# Patient Record
Sex: FEMALE | Race: BLACK OR AFRICAN AMERICAN | Employment: FULL TIME | URBAN - METROPOLITAN AREA
[De-identification: names, ages, dates, MRNs, and addresses within clinical notes are randomized per-mention and may not be internally consistent; named-entity substitution may affect disease eponyms.]

---

## 2019-10-08 PROBLEM — D25.9 FIBROID UTERUS: Status: ACTIVE | Noted: 2019-10-08

## 2020-07-28 ENCOUNTER — APPOINTMENT (OUTPATIENT)
Dept: NUTRITION | Age: 45
End: 2020-07-28
Payer: MEDICAID

## 2020-08-18 ENCOUNTER — HOSPITAL ENCOUNTER (OUTPATIENT)
Dept: NUTRITION | Age: 45
Discharge: HOME | End: 2020-08-18
Payer: MEDICAID

## 2020-08-18 PROCEDURE — 97802 MEDICAL NUTRITION INDIV IN: CPT

## 2020-08-18 NOTE — PROGRESS NOTES
[address] [place] [address], [facility]  Phone: [phone] Fax: [phone]   Nutrition Assessment - Medical Nutrition Therapy   Outpatient Initial Evaluation         Patient Name: Melany Healy : 1975   Treatment Diagnosis: Obesity   Referral Source: Lauralyn Fuel* Start of Care [facility]): 2020     Gender: female Age: 54 y.o. Ht: 66 in Wt:  246 lb  kg   BMI: 39.7 BMR   Male  BMR Female [id number]     Past Medical History:  Obesity, arthritis/tendonitis     Pertinent Medications:   Prilosec, CPAP   Biochemical Data:   No results found for: HBA1C, HGBE8, HVF8MOYC, UBL2IMAK  Lab Results   Component Value Date/Time    Sodium 139 10/11/2019 07:30 AM    Potassium 3.4 (L) 10/11/2019 07:30 AM    Chloride 104 10/11/2019 07:30 AM    CO2 27 10/11/2019 07:30 AM    Anion gap 8 10/11/2019 07:30 AM    Glucose 83 10/11/2019 07:30 AM    BUN 4 (L) 10/11/2019 07:30 AM    Creatinine 0.5 (L) 10/11/2019 07:30 AM    BUN/Creatinine ratio 22 (H) 2009 09:25 PM    GFR est AA >60.0 10/11/2019 07:30 AM    GFR est non-AA >60 10/11/2019 07:30 AM    Calcium 8.6 10/11/2019 07:30 AM    Bilirubin, total 0.2 10/10/2019 04:35 AM    Alk. phosphatase 50 10/10/2019 04:35 AM    Protein, total 5.5 (L) 10/10/2019 04:35 AM    Albumin 2.6 (L) 10/10/2019 04:35 AM    Globulin 3.8 2009 09:25 PM    A-G Ratio 1.0 2009 09:25 PM    ALT (SGPT) 12 10/10/2019 04:35 AM    AST (SGOT) 15 10/10/2019 04:35 AM     No results found for: CHOL, CHOLPOCT, CHOLX, CHLST, CHOLV, HDL, HDLPOC, HDLP, LDL, LDLCPOC, LDLC, DLDLP, VLDLC, VLDL, TGLX, TRIGL, TRIGP, TGLPOCT, CHHD, CHHDX  Lab Results   Component Value Date/Time    ALT (SGPT) 12 10/10/2019 04:35 AM    AST (SGOT) 15 10/10/2019 04:35 AM    Alk.  phosphatase 50 10/10/2019 04:35 AM    Bilirubin, direct 0.1 2009 09:25 PM    Bilirubin, total 0.2 10/10/2019 04:35 AM     Lab Results   Component Value Date/Time Creatinine 0.5 (L) 10/11/2019 07:30 AM     Lab Results   Component Value Date/Time    BUN 4 (L) 10/11/2019 07:30 AM     No results found for: MCACR, MCA1, MCA2, MCA3, MCAU, MCAU2, MCALPOCT     Assessment:   Pt in today with desire to lose weight to follow through with surgery. Pt has not tried to lose weight in the past but she did lose weight when she had a hysterectomy in October 2019 following a liquid diet. Pt works PT and she moves around at work at times. Pt does most of the cooking at home. She does not exercise aside from stretching/moving her legs d/t stiffness. Food & Nutrition: Pt does not usually eat breakfast, will skip lunch somtimes, and eat dinner. She does not eat candy or sweets often, but she will have Jello cheesecake at times. Pt also drinks a lot of juice and sweetened tea, but no soda. She will drink 3 bottles of plain water/day. No alcohol intake. Pt admits to eating a lot of take-out meals as well. B: skips  Sn: chips, tuna and crackers  L: skips when working or tuna with crackers or wings with fries  D: sandwich or beef with mac n cheese and collards     Estimated Needs   Kcals/ 1700  Protein: 106 Carbs: 191 Fat: 57   day  g/day  g/day  g/day        percent: 25  45  30               Nutrition Diagnosis PES: Obesity r/t excessive energy intake and physical inactivity aeb BMI of 39.7. Nutrition Intervention &  Education: Discussed the Healthy Plate Method and appropriate portion sizes of different food groups. Explained the importance of combining carbohydrates with protein at meals and reviewed foods that contain each nutrient. Emphasized the importance of consistent meal time intervals throughout the day to improve metabolism. Explained calorie density and empty calories to assist pt with understanding portion sizes and limiting excess calories.  Educated pt on all carbohydrates found in foods and encouraged no more than 30-40 g/meal. Encouraged pt not to go longer than 5 hours without eating but to space meals/snacks at least 3 hours apart. Handouts Provided: [x]  Carbohydrates  [x]  Protein  [x]  Non-starchy Vegatbles  [x]  Food Label  [x]  Meal and Snack Ideas  []  Food Journals []  Diabetes  []  Cholesterol  []  Sodium  []  Gen Nutr Guidelines  []  SBGM Guidelines  [x]  Others: snacks   Information Reviewed with: Pt   Readiness to Change Stage: []  Pre-contemplative    [x]  Contemplative  []  Preparation               []  Action                  []  Maintenance   Potential Barriers to Learning: []  Decline in memory    []  Language barrier   []  Other:  []  Emotional                  []  Limited mobility  []  Lack of motivation     [] Vision, hearing or cognitive impairment   Expected Compliance: Fair     Nutritional Goal - To promote lifestyle changes to result in:    [x]  Weight loss  []  Improved diabetic control  []  Decreased cholesterol levels  []  Decreased blood pressure  []  Weight maintenance []  Preventing any interactions associated with food allergies  []  Adequate weight gain toward goal weight  []  Other:        Patient Goals:   1. Limit CHO's to 30-45 g each time you eat  2. Have at least 1 serving of protein each time you eat (balanced meals)  3.  Aim to have a meal or snack within 1-2 hrs of waking - repeat every 3-5 hrs     Dietitian Signature: Domenico Richardson MS, RD Date: 8/18/2020   Follow-up: Awaiting return call from pt Time: 11:37 AM

## 2022-09-13 ENCOUNTER — HOSPITAL ENCOUNTER (OUTPATIENT)
Dept: PHYSICAL THERAPY | Age: 47
Discharge: HOME | End: 2022-09-13
Payer: MEDICAID

## 2022-09-13 ENCOUNTER — TELEPHONE (OUTPATIENT)
Dept: PHYSICAL THERAPY | Age: 47
End: 2022-09-13

## 2022-09-13 PROCEDURE — 97162 PT EVAL MOD COMPLEX 30 MIN: CPT

## 2022-09-13 NOTE — PROGRESS NOTES
PHYSICAL THERAPY - DAILY TREATMENT NOTE    Patient Name: Calvin Thornton        Date: 2022  : 1975   yes Patient  Verified  Visit #:     Insurance: Payor: [employer] / Plan: [id number] / Product Type: Managed Care Medicaid /      In time: 9:00 Out time: 9:43   Total Treatment Time: 43     Medicare/BCBS Time Tracking (below)   Total Timed Codes (min):  N/A 1:1 Treatment Time:  N/A     TREATMENT AREA =  Right ankle pain [M25.571]    SUBJECTIVE  Pain Level (on 0 to 10 scale):  6  / 10   Medication Changes/New allergies or changes in medical history, any new surgeries or procedures?    no  If yes, update Summary List   Subjective Functional Status/Changes:  []  No changes reported     See initial eval          OBJECTIVE    5 min Gait Training:  _100__ feet with __FWW_ device (CAMboot R WBAT with heel lift) on level surfaces with __SBA_ level of assistance - cues for upright posture and to keep walker on ground instead of lifting it up with each step (adjusted height of walker due to pt coming into PT with walker too low and pt hunched forward over walker with amb). Pt negotiated 4 steps with B rails, non reciprocal pattern in CAMboot WBAT with CGA (pt able to demonstrate correct pattern after instruction by PT). (Not billed)   Rationale: To improve ambulation safety and efficiency in order to improve patient's ability to safely ambulate at home for self care. 5 min Self Care: Pt educated on correct way to wrap R foot/ankle/LE with ace bandages to decrease swelling (PT completed wrapping and pt reported understanding). Pt instructed to make sure ace bandages are not too tight and to remove if foot/toes go numb. Reviewed importance of not completing forceful R ankle ROM. Pt educated on signs/sxs of DVT and red flags and to seek immediate medical attention/911 if those occur. Pt reports understanding.  (Not billed)   Rationale:     decrease swelling and avoid post op complications  to improve the patients ability to complete ADLs. Billed With/As:   [] TE   [] TA   [] Neuro   [x] Self Care Patient Education: [] Review HEP    [] Progressed/Changed HEP based on:   [] positioning   [] body mechanics   [] transfers   [] heat/ice application    [x] other: Reviewed POC and goals. Pt reports understanding. Other Objective/Functional Measures:    See initial eval     Post Treatment Pain Level (on 0 to 10) scale:   8  / 10     ASSESSMENT  Assessment/Changes in Function:     See initial eval      []  See Progress Note/Recertification   Patient will continue to benefit from skilled PT services to see initial eval   Progress toward goals / Updated goals:    Pt denied sharp pains or red flags during PT session. See initial eval.     PLAN  [x]  Upgrade activities as tolerated yes Continue plan of care   []  Discharge due to :    [x]  Other: PT 2x/week for 6 weeks.       Therapist: Rena Workman PT    Date: 9/13/2022 Time: 9:43 AM     Future Appointments   Date Time Provider David Coronel   9/15/2022  9:30 AM Nelson Pruitt, PT [id number]   9/19/2022 11:00 AM Isidra Hall, PT [facility]   9/21/2022 11:00 AM Isidra Hall, PT [facility]   9/26/2022 11:00 AM Isidra Hall, PT [facility]   9/28/2022 11:00 AM Isidra Hall, PT [facility]

## 2022-09-15 ENCOUNTER — HOSPITAL ENCOUNTER (OUTPATIENT)
Dept: PHYSICAL THERAPY | Age: 47
Discharge: HOME | End: 2022-09-15
Payer: MEDICAID

## 2022-09-15 PROCEDURE — 97112 NEUROMUSCULAR REEDUCATION: CPT

## 2022-09-15 PROCEDURE — 97116 GAIT TRAINING THERAPY: CPT

## 2022-09-15 PROCEDURE — 97110 THERAPEUTIC EXERCISES: CPT

## 2022-09-15 NOTE — PROGRESS NOTES
PHYSICAL THERAPY - DAILY TREATMENT NOTE    Patient Name: Joao Phillips        Date: 9/15/2022  : 1975   yes Patient  Verified  Visit #:   2   of   12  Insurance: Payor: Romeo Be / Plan: [employer] / Product Type: Managed Care Medicaid /      In time: 496 Out time: 7369   Total Treatment Time: 37     Medicare/BCBS Time Tracking (below)   Total Timed Codes (min):  na 1:1 Treatment Time:  na     TREATMENT AREA =  Right ankle pain [M25.571]    SUBJECTIVE  Pain Level (on 0 to 10 scale):  8  / 10   Medication Changes/New allergies or changes in medical history, any new surgeries or procedures?    no  If yes, update Summary List   Subjective Functional Status/Changes:  []  No changes reported     Patient reports she has been noticing more pain and swelling now that she can put weight on her ankle. Patient states she is happy to be off of her scooter. OBJECTIVE  14 min Therapeutic Exercise:  [x]  See flow sheet   Rationale:      increase ROM and increase strength to improve the patients ability to perform walking activities. 7 (NB) min Manual Therapy: Retrograde massage to R ankle; gentle DF stretch to neutral all in prone   Rationale:      decrease pain, increase ROM, increase tissue extensibility, and decrease trigger points to improve patient's ability to perform household activities. The manual therapy interventions were performed at a separate and distinct time from the therapeutic activities interventions. 8 min Neuromuscular Re-ed: [x]  See flow sheet   Rationale:    improve balance and increase proprioception to improve the patients ability to perform standing activities. 8 min Gait Training:  Review stair negotiation with use of B handrails, appropriate LE sequencing as well as alterations to improve efficiency   Rationale: To improve ambulation safety and efficiency in order to improve patient's ability to safely ambulate at home for self care.     Billed With/As:   [x] TE   [] TA   [] Neuro   [] Self Care Patient Education: [x] Review HEP    [] Progressed/Changed HEP based on:   [] positioning   [] body mechanics   [] transfers   [] heat/ice application    [] other:      Other Objective/Functional Measures:    Progressed therapy program per flowsheet in order to improve ankle ROM, strength, flexibility and weight bearing tolerance  Patient able to ascend/descend stairs with use of B handrails, however still requires cuing on appropriate sequencing      Post Treatment Pain Level (on 0 to 10) scale:   6 / 10     ASSESSMENT  Assessment/Changes in Function:     Patient responded well to treatment session. Educated on potential increase in soreness related to today's exercise and how to respond appropriately. []  See Progress Note/Recertification   Patient will continue to benefit from skilled PT services to modify and progress therapeutic interventions, address functional mobility deficits, address ROM deficits, address strength deficits, analyze and address soft tissue restrictions, analyze and cue movement patterns, analyze and modify body mechanics/ergonomics, assess and modify postural abnormalities, and address imbalance/dizziness to attain remaining goals.    Progress toward goals / Updated goals:    Progressing with STG#1 per performance in session     PLAN  [x]  Upgrade activities as tolerated yes Continue plan of care   []  Discharge due to :    []  Other:      Therapist: Hema Haywood PT    Date: 9/15/2022 Time: 11:40 AM     Future Appointments   Date Time Provider David Coronel   9/19/2022 11:00 AM Wood Cline, PT [facility]   9/21/2022 11:00 AM Wood Cline PT [facility]   9/26/2022 11:00 AM Wood Cline PT [facility]   9/28/2022 11:00 AM Wood Cline, PT [facility]

## 2022-09-16 ENCOUNTER — APPOINTMENT (OUTPATIENT)
Dept: PHYSICAL THERAPY | Age: 47
End: 2022-09-16
Payer: MEDICAID

## 2022-09-19 ENCOUNTER — HOSPITAL ENCOUNTER (OUTPATIENT)
Dept: PHYSICAL THERAPY | Age: 47
Discharge: HOME | End: 2022-09-19
Payer: MEDICAID

## 2022-09-19 PROCEDURE — 97110 THERAPEUTIC EXERCISES: CPT

## 2022-09-19 PROCEDURE — 97116 GAIT TRAINING THERAPY: CPT

## 2022-09-19 PROCEDURE — 97535 SELF CARE MNGMENT TRAINING: CPT

## 2022-09-19 NOTE — PROGRESS NOTES
PHYSICAL THERAPY - DAILY TREATMENT NOTE    Patient Name: Amalia Gonzalez        Date: 2022  : 1975   yes Patient  Verified  Visit #:   3   of   12  Insurance: Payor: [employer] Fee / Plan: Birdena Salines / Product Type: Managed Care Medicaid /      In time: 10:58 Out time: 11:40   Total Treatment Time: 42     Medicare/[facility] Time Tracking (below)   Total Timed Codes (min):  N/A 1:1 Treatment Time:  N/A     TREATMENT AREA =  Right ankle pain [M25.571]    SUBJECTIVE  Pain Level (on 0 to 10 scale):  7  / 10   Medication Changes/New allergies or changes in medical history, any new surgeries or procedures?    no  If yes, update Summary List   Subjective Functional Status/Changes:  []  No changes reported     Pt reports she is no longer getting the soft cast put on 2x/week by Dr. Burke Ga office. Pt reports using ace bandages daily, ice prn at night and has been taking showers and getting R foot/ankle wet (sitting in shower). Pt reports she has only been using the [facility] when she first gets up in the morning and has been amb without A device the rest of the day. Pt reports compliance with CAMboot during the day. Pt denies falls or red flags. Pt has been completing R ankle AROM daily.           OBJECTIVE  Modalities Rationale:      N/A  to improve patient's ability to N/A - pt declined CP   min [] Estim, type/location:                                      []  att     []  unatt     []  w/US     []  w/ice    []  w/heat    min []  Mechanical Traction: type/lbs                   []  pro   []  sup   []  int   []  cont    []  before manual    []  after manual    min []  Ultrasound, settings/location:      min []  Iontophoresis w/ dexamethasone, location:                                               []  take home patch       []  in clinic    min []  Ice     []  Heat    location/position:     min []  Vasopneumatic Device, press/temp:    If using vaso (only need to measure limb vaso being performed on) pre-treatment girth :       post-treatment girth :       measured at (landmark location) :      min []  Other:    [] Skin assessment post-treatment (if applicable):    []  intact    []  redness- no adverse reaction                  []redness - adverse reaction:      15 min Therapeutic Exercise:  [x]  See flow sheet   Rationale:      increase ROM and increase strength to improve the patients ability to complete ADLs. 7 min Manual Therapy: Gentle R ankle DF PROM to neutral and gentle retrograde massage in supine with R LE elevated on wedge. (Not billed)   Rationale:      increase ROM and decrease edema  to improve patient's ability to complete ADLs. The manual therapy interventions were performed at a separate and distinct time from the therapeutic activities interventions. 10 min Gait Trainin feet and 100 feet with SPC device on level surface with cues to improve upright posture and advancement of SPC. Pt negotiated 4 steps with non reciprocal pattern and B rail safely and with improved efficiency. All performed in CAMboot. Rationale: To improve ambulation safety and efficiency in order to improve patient's ability to safely ambulate at home for self care. 10 min Self Care: Reviewed proper way to complete ankle/LE ace bandage wrap to decrease swelling. Pt instructed on amb with SPC to improve safety, gait pattern and efficiency with amb. Pt instructed to use ice prn 10-15 minutes on / 1 hour off. Reviewed importance of avoiding forceful R ankle AROM and to limit R ankle DF ROM to neutral for now. Pt reports understanding. Rationale:     improve gait, decrease swelling and improve safety  to improve the patients ability to complete ADLs and amb.     Billed With/As:   [] TE   [] TA   [] Neuro   [x] Self Care Patient Education: [x] Review HEP    [] Progressed/Changed HEP based on:   [] positioning   [] body mechanics   [] transfers   [] heat/ice application    [] other:      Other Objective/Functional Measures:  *R ankle circumferential measurements: 29 cm malleoli, 34 cm ankle crease to heel, 26cm base of 5th    *Pt came into PT amb without A device in CAMboot (R LE abducted, decreased stance R and increased lateral movement). Pt amb with significant improvements in gait pattern with SPC and was able to complete safe amb over level surface with SPC.    *No signs/sxs of DVT or infection    *R ankle AROM: -6 DF with knee flex, 26 degrees PF (both without pain increase)     Post Treatment Pain Level (on 0 to 10) scale:   5  / 10     ASSESSMENT  Assessment/Changes in Function:     Pt denied sharp pains or red flags during PT Rx. Pt demonstrates decrease in R ankle/LE swelling and improvements in R ankle AROM this session. Pt reported a decrease in pain levels at end of PT session. []  See Progress Note/Recertification   Patient will continue to benefit from skilled PT services to modify and progress therapeutic interventions, address functional mobility deficits, address ROM deficits, address strength deficits, analyze and address soft tissue restrictions, analyze and cue movement patterns, analyze and modify body mechanics/ergonomics, address imbalance/dizziness, and instruct in home and community integration to attain remaining goals. Progress toward goals / Updated goals:    Pt demonstrates decrease in R ankle swelling/circumferential measurements - has met STG #2.       PLAN  [x]  Upgrade activities as tolerated yes Continue plan of care   []  Discharge due to :    []  Other:      Therapist: Luis Enrique Hinds PT    Date: 9/19/2022 Time: 11:40 AM     Future Appointments   Date Time Provider David Coronel   9/21/2022 11:00 AM Natalie Ovalles, JITENDRA [facility]   9/26/2022 11:00 AM Natalie Ovalles PT [facility]   9/28/2022 11:00 AM Natalie Ovalles PT [facility]

## 2022-09-21 ENCOUNTER — HOSPITAL ENCOUNTER (OUTPATIENT)
Dept: PHYSICAL THERAPY | Age: 47
Discharge: HOME | End: 2022-09-21
Payer: MEDICAID

## 2022-09-21 PROCEDURE — 97110 THERAPEUTIC EXERCISES: CPT

## 2022-09-21 PROCEDURE — 97116 GAIT TRAINING THERAPY: CPT

## 2022-09-21 PROCEDURE — 97535 SELF CARE MNGMENT TRAINING: CPT

## 2022-09-21 NOTE — PROGRESS NOTES
PHYSICAL THERAPY - DAILY TREATMENT NOTE    Patient Name: Ana Laura Brownlee        Date: 2022  : 1975   yes Patient  Verified  Visit #:      12  Insurance: Payor: Marmatthewana [place] / Plan: [id number] / Product Type: Managed Care Medicaid /      In time: 10:56 Out time: 11:49   Total Treatment Time: 53     Medicare/BCBS Time Tracking (below)   Total Timed Codes (min):  N/A 1:1 Treatment Time:  N/A     TREATMENT AREA =  Right ankle pain [M25.571]    SUBJECTIVE  Pain Level (on 0 to 10 scale):  8 / 10   Medication Changes/New allergies or changes in medical history, any new surgeries or procedures?    no  If yes, update Summary List   Subjective Functional Status/Changes:  []  No changes reported   Pt reports her ankle is sore and achy today, because she was more active yesterday (prolonged standing, cooking and cleaning). Pt reports compliance with CAMboot. Pt uses ice prn for 10-15 minutes at a time (usually in morning and at night). Pt uses SPC at home while on the first floor, but does not use it upstairs. Pt is negotiating stairs with rail, non-reciprocal pattern. Pt denies falls or red flags. OBJECTIVE  Modalities Rationale:     decrease edema, decrease inflammation, and decrease pain to improve patient's ability to complete ADLs.    min [] Estim, type/location:                                      []  att     []  unatt     []  w/US     []  w/ice    []  w/heat    min []  Mechanical Traction: type/lbs                   []  pro   []  sup   []  int   []  cont    []  before manual    []  after manual    min []  Ultrasound, settings/location:      min []  Iontophoresis w/ dexamethasone, location:                                               []  take home patch       []  in clinic   10 min [x]  Ice     []  Heat    location/position: Supine to R ankle and heel with LE elevated on wedge    min []  Vasopneumatic Device, press/temp:    If using vaso (only need to measure limb vaso being performed on)      pre-treatment girth :       post-treatment girth :       measured at (landmark location) :      min []  Other:    [x] Skin assessment post-treatment (if applicable):    [x]  intact    [x]  no adverse reaction                  []redness - adverse reaction:      16 min Therapeutic Exercise:  [x]  See flow sheet   Rationale:      increase ROM and increase strength to improve the patients ability to complete ADLs. 7 min Manual Therapy: Gentle R ankle DF PROM to neutral and gentle retrograde massage in supine with R LE elevated on wedge. (Not billed)   Rationale:      increase ROM and decrease edema  to improve patient's ability to complete ADLs. The manual therapy interventions were performed at a separate and distinct time from the therapeutic activities interventions. 10 min Gait Trainin feet with SPC device on level surface. Pt negotiated 4 small hurdles in parallel bars 2x and 6 small hurdles with SPC 2x - all non reciprocal pattern, SBA with cues to avoid LE circumduction. All performed in CAMboot. Rationale: To improve ambulation safety and efficiency in order to improve patient's ability to safely ambulate at home for self care. 10 min Self Care: Reviewed importance of not pushing off with R ankle/toes in bed, avoiding forceful R ankle ROM, avoiding DF past neutral and avoiding resisted PF. Reviewed importance of LE elevation to decrease swelling. Reviewed use of ice prn 10-15 minutes on / at least 1 hour off. Reviewed use of SPC with amb and importance of negotiating stairs with non reciprocal pattern and rail. Reviewed correct way to wrap ankle/foot in ace bandage, to avoid wrapping too tight and to remove if toes/foot feels numb. Pt reports understanding. Rationale:     improve gait, decrease swelling and improve safety  to improve the patients ability to complete ADLs and amb.     Billed With/As:   [] TE   [] TA   [] Neuro   [x] Self Care Patient Education: [x] Review HEP    [] Progressed/Changed HEP based on:   [] positioning   [] body mechanics   [] transfers   [] heat/ice application    [] other:      Other Objective/Functional Measures:  *R ankle DF AROM with knee flex: -2 degrees     *No signs/sxs of DVT or infection    *Pt came to PT without A device - decreased stance time R, R LE abducted and decreased kirby. Pt reports she will bring her SPC to next PT session. Post Treatment Pain Level (on 0 to 10) scale:   6  / 10     ASSESSMENT  Assessment/Changes in Function:     Pt denied sharp pains or red flags during PT Rx. Pt reported an improvement in pain/sxs at end of session. []  See Progress Note/Recertification   Patient will continue to benefit from skilled PT services to modify and progress therapeutic interventions, address functional mobility deficits, address ROM deficits, address strength deficits, analyze and address soft tissue restrictions, analyze and cue movement patterns, analyze and modify body mechanics/ergonomics, address imbalance/dizziness, and instruct in home and community integration to attain remaining goals. Progress toward goals / Updated goals:  Pt demonstrates improvements in R ankle DF AROM this session - progressing towards STG #3.       PLAN  [x]  Upgrade activities as tolerated yes Continue plan of care   []  Discharge due to :    []  Other:      Therapist: Lakshmi Navarro PT    Date: 9/21/2022 Time: 11:44 AM     Future Appointments   Date Time Provider David Coronel   9/26/2022 11:00 AM Eufemia Jefferson PT [facility]   9/28/2022 11:00 AM Eufemia Jefferson PT [facility]

## 2022-09-26 ENCOUNTER — TELEPHONE (OUTPATIENT)
Dept: PHYSICAL THERAPY | Age: 47
End: 2022-09-26

## 2022-09-26 ENCOUNTER — HOSPITAL ENCOUNTER (OUTPATIENT)
Dept: PHYSICAL THERAPY | Age: 47
Discharge: HOME | End: 2022-09-26
Payer: MEDICAID

## 2022-09-26 PROCEDURE — 97535 SELF CARE MNGMENT TRAINING: CPT

## 2022-09-26 PROCEDURE — 97110 THERAPEUTIC EXERCISES: CPT

## 2022-09-26 PROCEDURE — 97116 GAIT TRAINING THERAPY: CPT

## 2022-09-26 NOTE — PROGRESS NOTES
PHYSICAL THERAPY - DAILY TREATMENT NOTE    Patient Name: Zuleyka Villalpando        Date: 2022  : 1975   yes Patient  Verified  Visit #:     of   12  Insurance: Payor: Cy Hammans / Plan: Bennett Quintero / Product Type: Managed Care Medicaid /      In time: 11:08 Out time: 11:56   Total Treatment Time: 48     Medicare/BCBS Time Tracking (below)   Total Timed Codes (min):  N/A 1:1 Treatment Time:  N/A     TREATMENT AREA =  Right ankle pain [M25.571]    SUBJECTIVE  Pain Level (on 0 to 10 scale):  5 / 10   Medication Changes/New allergies or changes in medical history, any new surgeries or procedures?    no  If yes, update Summary List   Subjective Functional Status/Changes:  []  No changes reported   Pt reports compliance with CAMboot. Pt has been using walker upstairs and quad cane downstairs with amb. Pt denies falls or red flags. Pt reports compliance with HEP. Pt reports she does not have a SPC she can use at this time.           OBJECTIVE  Modalities Rationale:      N/A  to improve patient's ability to complete N/A - pt declined CP   min [] Estim, type/location:                                      []  att     []  unatt     []  w/US     []  w/ice    []  w/heat    min []  Mechanical Traction: type/lbs                   []  pro   []  sup   []  int   []  cont    []  before manual    []  after manual    min []  Ultrasound, settings/location:      min []  Iontophoresis w/ dexamethasone, location:                                               []  take home patch       []  in clinic    min []  Ice     []  Heat    location/position:     min []  Vasopneumatic Device, press/temp:    If using vaso (only need to measure limb vaso being performed on)      pre-treatment girth :       post-treatment girth :       measured at (landmark location) :      min []  Other:    [] Skin assessment post-treatment (if applicable):    []  intact    []  no adverse reaction                  []redness - adverse reaction: 21 min Therapeutic Exercise:  [x]  See flow sheet   Rationale:      increase ROM and increase strength to improve the patients ability to complete ADLs. 7 min Manual Therapy: Gentle R ankle DF PROM to neutral and gentle retrograde massage in supine with R LE elevated on wedge. Submax isometrics (25%) R ankle DF/INV/EV 10x5 seconds each direction. (Not billed)   Rationale:      increase ROM, decrease edema , and improve strength/stability  to improve patient's ability to complete ADLs. The manual therapy interventions were performed at a separate and distinct time from the therapeutic activities interventions. 10 min Gait Trainin feet with quad cane device on level surface, with cues to make sure all points of quad cane are touching the ground during amb. Pt negotiated 6 small hurdles with non-reciprocal pattern, quad cane, SBA (cues to avoid LE circumduction and for advancement of quad cane). Pt negotiated 4 steps, B rail, non reciprocal pattern, safely. All performed in CAMboot. Rationale: To improve ambulation safety and efficiency in order to improve patient's ability to safely ambulate at home for self care. 10 min Self Care: Reviewed importance of not pushing off with R ankle/toes in bed, avoiding forceful R ankle ROM, avoiding DF past neutral and avoiding resisted PF. Reviewed use of ice prn 10-15 minutes on / at least 1 hour off. Reviewed use of quad cane with amb and importance of negotiating stairs with non reciprocal pattern and rail. Reviewed correct way to wrap ankle/foot in ace bandage, to avoid wrapping too tight and to remove if toes/foot feels numb. Pt reports understanding. Rationale:     improve gait, decrease swelling and improve safety  to improve the patients ability to complete ADLs and amb.     Billed With/As:   [] TE   [] TA   [] Neuro   [x] Self Care Patient Education: [x] Review HEP    [] Progressed/Changed HEP based on:   [] positioning   [] body mechanics [] transfers   [] heat/ice application    [] other:      Other Objective/Functional Measures:  *No signs/sxs of DVT or infection    *Added: NUstep in boot, seated HR AROM (no resistance) this session to improve mobility    *R ankle DF AROM after manual: 0 degrees       Post Treatment Pain Level (on 0 to 10) scale:   7 / 10     ASSESSMENT  Assessment/Changes in Function:     Pt denied sharp pains or red flags during PT Rx. Pt denied pain increase with new exercises. Pt demonstrates safe negotiation of 4 steps with B rail, CAMboot R and step to pattern. Pt reported feeling 0/10 pain levels with non weight bearing and 7/10 pain levels with wt bearing at end of session. []  See Progress Note/Recertification   Patient will continue to benefit from skilled PT services to modify and progress therapeutic interventions, address functional mobility deficits, address ROM deficits, address strength deficits, analyze and address soft tissue restrictions, analyze and cue movement patterns, analyze and modify body mechanics/ergonomics, address imbalance/dizziness, and instruct in home and community integration to attain remaining goals. Progress toward goals / Updated goals:  Pt demonstrates R ankle DF AROM to 0 degrees without an increase in pain levels - has met STG #3. PLAN  [x]  Upgrade activities as tolerated yes Continue plan of care   []  Discharge due to :    [x]  Other: Re-assess R ankle AROM all motions next session.      Therapist: Saumya Rosenberg PT    Date: 9/26/2022 Time: 11:50 AM     Future Appointments   Date Time Provider David Coronel   9/28/2022 11:00 AM Stuart Leyva PT [id number]

## 2022-09-28 ENCOUNTER — HOSPITAL ENCOUNTER (OUTPATIENT)
Dept: PHYSICAL THERAPY | Age: 47
Discharge: HOME | End: 2022-09-28
Payer: MEDICAID

## 2022-09-28 PROCEDURE — 97116 GAIT TRAINING THERAPY: CPT

## 2022-09-28 PROCEDURE — 97535 SELF CARE MNGMENT TRAINING: CPT

## 2022-09-28 PROCEDURE — 97110 THERAPEUTIC EXERCISES: CPT

## 2022-09-28 NOTE — PROGRESS NOTES
PHYSICAL THERAPY - DAILY TREATMENT NOTE    Patient Name: Lashon Ortiz        Date: 2022  : 1975   yes Patient  Verified  Visit #:      12  Insurance: Payor: Josie Artis / Plan: Number 100 / Product Type: Managed Care Medicaid /      In time: 11:03 Out time: 11:54   Total Treatment Time: 51     Medicare/BCBS Time Tracking (below)   Total Timed Codes (min):  N/A 1:1 Treatment Time:  N/A     TREATMENT AREA =  Right ankle pain [M25.571]    SUBJECTIVE  Pain Level (on 0 to 10 scale):  6 / 10   Medication Changes/New allergies or changes in medical history, any new surgeries or procedures?    no  If yes, update Summary List   Subjective Functional Status/Changes:  []  No changes reported   Pt reports feeling most of her pain/discomfort/soreness in the heel and back of ankle. Pt reports incision site is itchy at times. Pt denies falls or red flags. Pt reports compliance with HEP and CAMboot. Avg pain level: 7/10, Max pain level 10/10 (after prolonged amb/standing and stair negotiation). Pt denies falls or red flags.         OBJECTIVE  Modalities Rationale:      N/A  to improve patient's ability to complete N/A - pt declined CP   min [] Estim, type/location:                                      []  att     []  unatt     []  w/US     []  w/ice    []  w/heat    min []  Mechanical Traction: type/lbs                   []  pro   []  sup   []  int   []  cont    []  before manual    []  after manual    min []  Ultrasound, settings/location:      min []  Iontophoresis w/ dexamethasone, location:                                               []  take home patch       []  in clinic    min []  Ice     []  Heat    location/position:     min []  Vasopneumatic Device, press/temp:    If using vaso (only need to measure limb vaso being performed on)      pre-treatment girth :       post-treatment girth :       measured at (landmark location) :      min []  Other:    [] Skin assessment post-treatment (if applicable):    []  intact    []  no adverse reaction                  []redness - adverse reaction:      21 min Therapeutic Exercise:  [x]  See flow sheet   Rationale:      increase ROM and increase strength to improve the patients ability to complete ADLs. 7 min Manual Therapy: Gentle R ankle retrograde massage in supine with R LE elevated on wedge. Submax isometrics (25%) R ankle DF/INV/EV 10x5 seconds each direction. (Not billed)   Rationale:      decrease edema  and improve strength/stability  to improve patient's ability to complete ADLs. The manual therapy interventions were performed at a separate and distinct time from the therapeutic activities interventions. 12 min Gait Training: All completed in CAMboot without heel lift: 250 feet with quad cane device on level surface, with min cues for 4 point contact of quad cane. Pt negotiated 6 small hurdles with non-reciprocal pattern, quad cane, SBA (multiple cues to avoid LE circumduction, complete with non reciprocal pattern and correct advancement of quad cane). Pt negotiated 4 steps, B rail, non reciprocal pattern (required cues to ascend with L foot first in non reciprocal pattern). Rationale: To improve ambulation safety and efficiency in order to improve patient's ability to safely ambulate at home for self care. 11 min Self Care: Reviewed signs/sxs of infection with pt and to contact MD immediately if those occur. Reviewed importance of not pushing off with R ankle/toes in bed, avoiding forceful R ankle ROM, avoiding DF past neutral and avoiding resisted PF. Reviewed use of ice prn 10-15 minutes on / at least 1 hour off. Reviewed use of quad cane with amb and importance of negotiating stairs with non reciprocal pattern and rail. Reviewed correct way to wrap ankle/foot in ace bandage, to avoid wrapping too tight and to remove if toes/foot feels numb. Pt reports understanding.    Rationale:     improve gait, decrease swelling and improve safety  to improve the patients ability to complete ADLs and amb. Billed With/As:   [] TE   [] TA   [] Neuro   [x] Self Care Patient Education: [x] Review HEP    [] Progressed/Changed HEP based on:   [] positioning   [] body mechanics   [] transfers   [] heat/ice application    [] other:      Other Objective/Functional Measures:  *No signs/sxs of DVT or infection    *[facility]     *R ankle AROM: 0 degrees DF, 34 degrees PF, 12 degrees INV, 7 degrees EV       Post Treatment Pain Level (on 0 to 10) scale:   0 / 10     ASSESSMENT  Assessment/Changes in Function:     Pt denied sharp pains or red flags during PT Rx. Pt denied pain at end of session. []  See Progress Note/Recertification   Patient will continue to benefit from skilled PT services to modify and progress therapeutic interventions, address functional mobility deficits, address ROM deficits, address strength deficits, analyze and address soft tissue restrictions, analyze and cue movement patterns, analyze and modify body mechanics/ergonomics, address imbalance/dizziness, and instruct in home and community integration to attain remaining goals. Progress toward goals / Updated goals:  Pt demonstrates improvements in R ankle AROM. Pt has not met LTG #2 (still reporting 10/10 max pain levels). Pt progressing towards LTG #3 (progressed from [address] [place] [address] with heel lift to [facility] without heel lift this session). PLAN  [x]  Upgrade activities as tolerated yes Continue plan of care   []  Discharge due to :    []  Other:      Therapist: Juan Pablo Stuart PT    Date: 9/28/2022 Time: 11:16 AM     No future appointments.

## 2022-10-05 ENCOUNTER — HOSPITAL ENCOUNTER (OUTPATIENT)
Dept: PHYSICAL THERAPY | Age: 47
Discharge: HOME | End: 2022-10-05
Payer: MEDICAID

## 2022-10-05 PROCEDURE — 97112 NEUROMUSCULAR REEDUCATION: CPT

## 2022-10-05 PROCEDURE — 97110 THERAPEUTIC EXERCISES: CPT

## 2022-10-05 PROCEDURE — 97116 GAIT TRAINING THERAPY: CPT

## 2022-10-05 NOTE — PROGRESS NOTES
PHYSICAL THERAPY - DAILY TREATMENT NOTE    Patient Name: Sahra Davis        Date: 10/5/2022  : 1975   yes Patient  Verified  Visit #:      12  Insurance: Payor: Karan Zee / Plan: Amelia Delgado / Product Type: Managed Care Medicaid /      In time: 138 Out time: 1010   Total Treatment Time: 37     Medicare/BCBS Time Tracking (below)   Total Timed Codes (min):  na 1:1 Treatment Time:  na     TREATMENT AREA =  Right ankle pain [M25.571]    SUBJECTIVE  Pain Level (on 0 to 10 scale):  4  / 10   Medication Changes/New allergies or changes in medical history, any new surgeries or procedures?    no  If yes, update Summary List   Subjective Functional Status/Changes:  []  No changes reported     Patient reports she isn't having much pain today. Patient states overall she has been doing well since her last appointment. OBJECTIVE  10 min Therapeutic Exercise:  [x]  See flow sheet   Rationale:      increase ROM and increase strength to improve the patients ability to perform walking activities. 7 min Manual Therapy: Retrograde massage to R ankle; gentle ankle DF stretch, manual isometrics into IV/EV 5x15 sec each   Rationale:      decrease pain, increase ROM, increase tissue extensibility, and decrease trigger points to improve patient's ability to perform standing activities. The manual therapy interventions were performed at a separate and distinct time from the therapeutic activities interventions. 10 min Neuromuscular Re-ed: [x]  See flow sheet   Rationale:    improve coordination, improve balance, and increase proprioception to improve the patients ability to perform household activities. 10 min Gait Training:  _50_ feet independently level surfaces with __supervision_ level of assistance   Rationale: To improve ambulation safety and efficiency in order to improve patient's ability to safely ambulate at home for self care.     Billed With/As:   [x] TE   [] TA   [] Neuro   [] Self Care Patient Education: [x] Review HEP    [] Progressed/Changed HEP based on:   [] positioning   [] body mechanics   [] transfers   [] heat/ice application    [] other:      Other Objective/Functional Measures:    Patient able to perform clay negotiation without use of quad cane this session and maintaining use of CAM boot  Added tandem balance this session in order to improve patient stabilization     Post Treatment Pain Level (on 0 to 10) scale:   0  / 10     ASSESSMENT  Assessment/Changes in Function:     Patient tolerated progression of program well. Plan to re-assess objective measurements and NV in preparation for follow up with MD on 10/10/22. []  See Progress Note/Recertification   Patient will continue to benefit from skilled PT services to modify and progress therapeutic interventions, address functional mobility deficits, address ROM deficits, address strength deficits, analyze and address soft tissue restrictions, analyze and cue movement patterns, analyze and modify body mechanics/ergonomics, and assess and modify postural abnormalities to attain remaining goals. Progress toward goals / Updated goals:    Progressing with LTG#3-improving gait mechanics compared to previous sessions.       PLAN  [x]  Upgrade activities as tolerated yes Continue plan of care   []  Discharge due to :    []  Other:      Therapist: Faiza Wills, PT    Date: 10/5/2022 Time: 11:48 AM     Future Appointments   Date Time Provider David Coronel   10/7/2022 10:45 AM Yazan Campbell PT [facility]   10/10/2022 11:00 AM Huan Arguello PT [facility]   10/12/2022  9:00 AM Madina Tucker [facility]   10/17/2022 11:00 AM Huan Arguello PT [facility]   10/19/2022  9:25 AM Jen Young, PT [facility] [id number]

## 2022-10-07 ENCOUNTER — HOSPITAL ENCOUNTER (OUTPATIENT)
Dept: PHYSICAL THERAPY | Age: 47
Discharge: HOME | End: 2022-10-07
Payer: MEDICAID

## 2022-10-07 PROCEDURE — 97535 SELF CARE MNGMENT TRAINING: CPT

## 2022-10-07 PROCEDURE — 97110 THERAPEUTIC EXERCISES: CPT

## 2022-10-07 PROCEDURE — 97112 NEUROMUSCULAR REEDUCATION: CPT

## 2022-10-07 NOTE — THERAPY RECERTIFICATION
[facility] PHYSICAL THERAPY  [address],[facility], [facility] - Phone: [phone]  Fax: [phone]  PROGRESS NOTE  Patient Name: Raven Sanders : 1975   Treatment/Medical Diagnosis: Right ankle pain [M25.571]   Referral Source: China Riley DPM     Date of Initial Visit: 22 Attended Visits: 8 Missed Visits: 0     SUMMARY OF TREATMENT  Patient has attended 7 follow up visits since her initial evaluation on 22. Patient has received therapeutic exercise, manual therapy, neuromuscular re-education and gait training in order to improve  Rankle ROM, mobility, flexibility, strength and pain reduction. CURRENT STATUS  Patient has progressed well with her PT program to date. Patient demonstrates an ability to ambulate with her CAM boot only, no longer requiring an assistive device. Patient's swelling has reduced as listed below which has helped control her pain levels. While patient's DF AROM has not improved, she demonstrates gains in IV (30 degrees), EV (3 degrees), and PF (50 degrees). Patient is appropriate for the next phase of her rehab program per MD approval.     Goal/Measure of Progress Goal Met? 1.  Pt to demonstrate independence with HEP to improve R ankle AROM and decrease LE swelling for ADLs and amb. Status at last Eval: N/a Current Status: Independent with HEP yes   2. Pt to demonstrate . 5cm or > decrease in R ankle/foot circumferential measurements to improve LE proprioception with amb and ADLs. Status at last Eval: circumferential measurements R ankle/foot (Malleoli R 30 cm, L 28 cm; Ankle crease to heel R 34.5 cm, L 34 cm; Base of 5th R 26.5 cm L 25.5 cm Current Status: R Circumferential measurements: Malleoli-29 cm  Ankle crease to heel- 33cm  Base of 5th metatarsal 22 cm yes   3.   Pt to demonstrate 0 degrees R ankle DF AROM/PROM to improve mobility for amb   Status at last Eval: R ankle AROM (R: -8 degrees DF with knee flex, -11 degrees DF with knee ext Current Status: R ankle DF with knee extended -13 no     New Goals to be achieved in __4__  weeks:  1. Pt to report 46/100 or > on FOTO scores to improve functional mobility for amb and stair negotiation  2. Pt to report 50% or > decrease in max pain levels to improve functional mobility for ADLs and amb  3. Pt to demonstrate even stride length with amb over level surface of 250 feet in regular shoe without A device safely   RECOMMENDATIONS  Patient would benefit from continued PT 2x/week for 4 weeks in order to further address impairments and functional limitations. If you have any questions/comments please contact us directly at [phone]. Thank you for allowing us to assist in the care of your patient. Therapist Signature: Raissa Concepcion PT Date: 10/7/2022     Time: 11:51 AM   NOTE TO PHYSICIAN:  PLEASE COMPLETE THE ORDERS BELOW AND FAX TO   [facility] [place] [facility]: [phone]  If you are unable to process this request in 24 hours please contact our office: [phone]    ___ I have read the above report and request that my patient continue as recommended.   ___ I have read the above report and request that my patient continue therapy with the following changes/special instructions:_________________________________________________________   ___ I have read the above report and request that my patient be discharged from therapy.      Physician Signature:        Date:       Time:                                 Shweta Curran DPM

## 2022-10-07 NOTE — PROGRESS NOTES
PHYSICAL THERAPY - DAILY TREATMENT NOTE    Patient Name: Sahra Davis        Date: 10/7/2022  : 1975   yes Patient  Verified  Visit #:   8   of   12(+8)  Insurance: Payor: Karan Zee / Plan: Amelia Delgado / Product Type: Managed Care Medicaid /      In time: 1408 Out time: 4419   Total Treatment Time: 40     Medicare/BCBS Time Tracking (below)   Total Timed Codes (min):  na 1:1 Treatment Time:  na     TREATMENT AREA =  Right ankle pain [M25.571]    SUBJECTIVE  Pain Level (on 0 to 10 scale):  0  / 10   Medication Changes/New allergies or changes in medical history, any new surgeries or procedures?    no  If yes, update Summary List   Subjective Functional Status/Changes:  []  No changes reported     See PN          OBJECTIVE  13 min Therapeutic Exercise:  [x]  See flow sheet   Rationale:      increase ROM and increase strength to improve the patients ability to perform walking activities. 7 min Manual Therapy: Retrograde massage to R ankle, R ankle DF stretch, IV/EV isometrics 5x10 sec each   Rationale:      decrease pain, increase ROM, increase tissue extensibility, and decrease edema  to improve patient's ability to perform standing activities. The manual therapy interventions were performed at a separate and distinct time from the therapeutic activities interventions. 10 min Neuromuscular Re-ed: [x]  See flow sheet   Rationale:    improve coordination, improve balance, and increase proprioception to improve the patients ability to perform household activities. 10 min Self Care: Updated objective measurements, discussed prognosis and future PT program   Rationale:    increase ROM to improve the patients ability to perform transfers.      Billed With/As:   [] TE   [] TA   [] Neuro   [x] Self Care Patient Education: [x] Review HEP    [] Progressed/Changed HEP based on:   [] positioning   [] body mechanics   [] transfers   [] heat/ice application    [] other:      Other Objective/Functional Measures:    See PN     Post Treatment Pain Level (on 0 to 10) scale:   0  / 10     ASSESSMENT  Assessment/Changes in Function:     See PN     []  See Progress Note/Recertification   Patient will continue to benefit from skilled PT services to modify and progress therapeutic interventions, address functional mobility deficits, address ROM deficits, address strength deficits, analyze and address soft tissue restrictions, analyze and cue movement patterns, analyze and modify body mechanics/ergonomics, and address imbalance/dizziness to attain remaining goals.    Progress toward goals / Updated goals:    See PN     PLAN  [x]  Upgrade activities as tolerated yes Continue plan of care   []  Discharge due to :    []  Other:      Therapist: Thalia Painting PT    Date: 10/7/2022 Time: 11:58 AM     Future Appointments   Date Time Provider David Coronel   10/10/2022 11:00 AM Julisa Ely, PT [id number]   10/12/2022  9:00 AM Adeola Armstrong [facility]   10/17/2022 11:00 AM Julisa Ely PT [facility]   10/19/2022  9:25 AM Annette Vences, PT [address]

## 2022-10-10 ENCOUNTER — TELEPHONE (OUTPATIENT)
Dept: PHYSICAL THERAPY | Age: 47
End: 2022-10-10

## 2022-10-12 ENCOUNTER — HOSPITAL ENCOUNTER (OUTPATIENT)
Dept: PHYSICAL THERAPY | Age: 47
Discharge: HOME | End: 2022-10-12
Payer: MEDICAID

## 2022-10-12 PROCEDURE — 97110 THERAPEUTIC EXERCISES: CPT

## 2022-10-12 PROCEDURE — 97112 NEUROMUSCULAR REEDUCATION: CPT

## 2022-10-12 PROCEDURE — 97116 GAIT TRAINING THERAPY: CPT

## 2022-10-12 NOTE — PROGRESS NOTES
PHYSICAL THERAPY - DAILY TREATMENT NOTE    Patient Name: George Cross        Date: 10/12/2022  : 1975   yes Patient  Verified  Visit #:      20  Insurance: Payor: Cal Lombardo / Plan: Key Romero / Product Type: Managed Care Medicaid /      In time: 645 Out time: 337   Total Treatment Time: 40     Medicare/BCBS Time Tracking (below)   Total Timed Codes (min):  33 1:1 Treatment Time:  na     TREATMENT AREA =  Right ankle pain [M25.571]    SUBJECTIVE  Pain Level (on 0 to 10 scale): 6 / 10   Medication Changes/New allergies or changes in medical history, any new surgeries or procedures?    no  If yes, update Summary List   Subjective Functional Status/Changes:  []  No changes reported     Patient reports seeing MD the other day and per pt, MD released her to RTW at full duty in CAM boot. Reports more pain in the foot today because this is the first day without CAM boot. Denies any falls or red flags since LV. OBJECTIVE  15 min Therapeutic Exercise:  [x]  See flow sheet   Rationale:      increase ROM and increase strength to improve the patients ability to perform walking activities. 7 min Manual Therapy: Retrograde massage to R ankle, R ankle DF stretch, scar massage. All in prone. Rationale:      decrease pain, increase ROM, increase tissue extensibility, and decrease edema  to improve patient's ability to perform standing activities. The manual therapy interventions were performed at a separate and distinct time from the therapeutic activities interventions. 10 min Neuromuscular Re-ed: [x]  See flow sheet   Rationale:    improve coordination, improve balance, and increase proprioception to improve the patients ability to perform household activities. 8 min Gait Trainin feet with no device on level surfaces with stand by level of assistance. Cues for heel toe gait pattern and increase step length to assist with normalizing gait pattern.     Rationale: To improve ambulation safety and efficiency in order to improve patient's ability to safely ambulate at home for self care. Billed With/As:   [x] TE   [] TA   [] Neuro   [] Self Care Patient Education: [x] Review HEP    [] Progressed/Changed HEP based on:   [] positioning   [] body mechanics   [] transfers   [] heat/ice application    [] other:      Other Objective/Functional Measures:    *presented to PT session without CAM boot per MD clearance. Performed exercises without boot. *gait analysis: decrease stance time on R LE, decrease L step length, minimal push off on R foot  *increase tenderness and sensitivity along distal posterior scar. Post Treatment Pain Level (on 0 to 10) scale:   0  / 10     ASSESSMENT  Assessment/Changes in Function:     Patient demo improved gait mechanics following verbal cues for increase step length and heel toe gait pattern. Discussed likelihood of increase swelling and pain upon returning to work, therefore advised pt to elevate as much as she can throughout the work day and utilize ice at the end of the work day. Also discussed self scar massage to decrease tenderness/sensitivity. Pt acknowledged understanding. []  See Progress Note/Recertification   Patient will continue to benefit from skilled PT services to modify and progress therapeutic interventions, address functional mobility deficits, address ROM deficits, address strength deficits, analyze and address soft tissue restrictions, analyze and cue movement patterns, analyze and modify body mechanics/ergonomics, and address imbalance/dizziness to attain remaining goals. Progress toward goals / Updated goals:    New Goals to be achieved in __4__  weeks:  1. Pt to report 46/100 or > on FOTO scores to improve functional mobility for amb and stair negotiation  2. Pt to report 50% or > decrease in max pain levels to improve functional mobility for ADLs and amb  3.  Pt to demonstrate even stride length with amb over level surface of 250 feet in regular shoe without A device safely initiated 10/12 - ambulated 150 feet with no CAM boot or AD       PLAN  [x]  Upgrade activities as tolerated yes Continue plan of care   []  Discharge due to :    []  Other:      Therapist: FRANCISCA Gross    Date: 10/12/2022 Time: 106 PM     Future Appointments   Date Time Provider David Coronel   10/12/2022  9:00 AM Ai Paula [facility]   10/17/2022 11:00 AM Diane Mike,  [facility]   10/19/2022  9:25 AM [facility]

## 2022-10-17 ENCOUNTER — APPOINTMENT (OUTPATIENT)
Dept: PHYSICAL THERAPY | Age: 47
End: 2022-10-17
Payer: MEDICAID

## 2022-10-19 ENCOUNTER — APPOINTMENT (OUTPATIENT)
Dept: PHYSICAL THERAPY | Age: 47
End: 2022-10-19
Payer: MEDICAID

## 2022-10-24 ENCOUNTER — TELEPHONE (OUTPATIENT)
Dept: PHYSICAL THERAPY | Age: 47
End: 2022-10-24

## 2022-10-24 ENCOUNTER — HOSPITAL ENCOUNTER (OUTPATIENT)
Dept: PHYSICAL THERAPY | Age: 47
Discharge: HOME | End: 2022-10-24
Payer: MEDICAID

## 2022-10-24 PROCEDURE — 97116 GAIT TRAINING THERAPY: CPT

## 2022-10-24 PROCEDURE — 97112 NEUROMUSCULAR REEDUCATION: CPT

## 2022-10-24 PROCEDURE — 97110 THERAPEUTIC EXERCISES: CPT

## 2022-10-24 NOTE — PROGRESS NOTES
PHYSICAL THERAPY - DAILY TREATMENT NOTE    Patient Name: Kirill Quiñones        Date: 10/24/2022  : 1975   yes Patient  Verified  Visit #:   10   of   16  Insurance: Payor: Ely Leeanna / Plan: [employer] / Product Type: Managed Care Medicaid /      In time: 11:58 Out time: 12:41   Total Treatment Time: 43     Medicare/BCBS Time Tracking (below)   Total Timed Codes (min):  N/A 1:1 Treatment Time:  N/A     TREATMENT AREA =  Right ankle pain [M25.571]    SUBJECTIVE  Pain Level (on 0 to 10 scale):  7  / 10   Medication Changes/New allergies or changes in medical history, any new surgeries or procedures?    no  If yes, update Summary List   Subjective Functional Status/Changes:  []  No changes reported     Pt reports returning to work for long shifts (6-7 hours 2 days/week and 12 hours 3 days/week) of prolonged standing. Pt reports max pain level at 10/10 after work. Pt is wearing a compression sock and the CAMboot at work. Pt reports wearing the heel lift intermittently in regular shoe (came in today without heel lift in tennis shoes). Pt reports compliance with HEP. Pt denies falls or red flags.            OBJECTIVE  Modalities Rationale:      N/A  to improve patient's ability to N/A - pt declined CP   min [] Estim, type/location:                                      []  att     []  unatt     []  w/US     []  w/ice    []  w/heat    min []  Mechanical Traction: type/lbs                   []  pro   []  sup   []  int   []  cont    []  before manual    []  after manual    min []  Ultrasound, settings/location:      min []  Iontophoresis w/ dexamethasone, location:                                               []  take home patch       []  in clinic    min []  Ice     []  Heat    location/position:     min []  Vasopneumatic Device, press/temp:    If using vaso (only need to measure limb vaso being performed on)      pre-treatment girth :       post-treatment girth :       measured at (landmark location) :      min []  Other:    [] Skin assessment post-treatment (if applicable):    []  intact    []  redness- no adverse reaction                  []redness - adverse reaction:      15 min Therapeutic Exercise:  [x]  See flow sheet   Rationale:      increase ROM and increase strength to improve the patients ability to complete amb/standing activities. 5 min Manual Therapy: Retrograde massage to R ankle/foot with LE elevated on wedge. (Not billed)   Rationale:      increase ROM and decrease edema  to improve patient's ability to complete amb/standing activities. The manual therapy interventions were performed at a separate and distinct time from the therapeutic activities interventions. 10 min Neuromuscular Re-ed: [x]  See flow sheet   Rationale:    improve coordination, improve balance, and increase proprioception to improve the patients ability to complete amb/standing activities. 8 min Gait Training:  _100__ feet x2 without device on level surfaces in regular shoe with _SBA__ level of assistance. Pt negotiated 6 small hurdles 4 times with non reciprocal pattern, CGA. Rationale: To improve ambulation safety and efficiency in order to improve patient's ability to safely ambulate at home for self care. 5 min Self Care: Reviewed importance of wearing heel lift (level 2) in all regular shoes. Reviewed importance of elevation to decrease swelling. Pt instructed to use ice prn 10-15 minutes on /1 hour off. Pt reports understanding. (Not billed)   Rationale:     improve gait pattern, decrease swelling, decrease pain  to improve the patients ability to complete amb/standing activities.     Billed With/As:   [] TE   [] TA   [] Neuro   [x] Self Care Patient Education: [x] Review HEP    [] Progressed/Changed HEP based on:   [] positioning   [] body mechanics   [] transfers   [] heat/ice application    [] other:      Other Objective/Functional Measures:    *Added Ankle T-band 4 way with yellow Tband to improve ankle strength/stability (able to complete without increase in pain levels). Added step through in parallel bars to improve gait pattern. *R ankle AROM: 5 degrees DF (knee flex), 38 PF, 17 INV, 7 EV    *No signs/sxs of infection or DVT    *Decreased swelling noted after manual Rx: 28 cm malleoli, 34 cm ankle crease to heel, 25 cm base of 5th     Post Treatment Pain Level (on 0 to 10) scale:   6  / 10     ASSESSMENT  Assessment/Changes in Function:     Pt denied sharp pains, red flags or pain increase during PT session. Pt reported an improvement in pain/sxs at end of session. []  See Progress Note/Recertification   Patient will continue to benefit from skilled PT services to modify and progress therapeutic interventions, address functional mobility deficits, address ROM deficits, address strength deficits, analyze and address soft tissue restrictions, analyze and cue movement patterns, analyze and modify body mechanics/ergonomics, assess and modify postural abnormalities, address imbalance/dizziness, and instruct in home and community integration to attain remaining goals. Progress toward goals / Updated goals:    Pt continues to amb with antalgic gait, decreased stance R, foot flat initial contact and decreased toe off - has not met new goal #3. Recent max pain levels at 10/10 due to returning to work - has not met new goal #2. PLAN  [x]  Upgrade activities as tolerated yes Continue plan of care   []  Discharge due to :    [x]  Other: PT to contact Dr. Guillermina West about progression from heel lift to regular shoe without lift.       Therapist: Scott Garg, PT    Date: 10/24/2022 Time: 12:02 PM     Future Appointments   Date Time Provider David Coronel   10/31/2022 10:00 AM Diane Mike,  [address] [facility]   11/7/2022 12:00 PM Diane Mike,  [address] [facility]

## 2022-10-31 ENCOUNTER — HOSPITAL ENCOUNTER (OUTPATIENT)
Dept: PHYSICAL THERAPY | Age: 47
Discharge: HOME | End: 2022-10-31
Payer: MEDICAID

## 2022-10-31 ENCOUNTER — TELEPHONE (OUTPATIENT)
Dept: PHYSICAL THERAPY | Age: 47
End: 2022-10-31

## 2022-10-31 PROCEDURE — 97112 NEUROMUSCULAR REEDUCATION: CPT

## 2022-10-31 PROCEDURE — 97110 THERAPEUTIC EXERCISES: CPT

## 2022-10-31 PROCEDURE — 97530 THERAPEUTIC ACTIVITIES: CPT

## 2022-10-31 PROCEDURE — 97116 GAIT TRAINING THERAPY: CPT

## 2022-10-31 NOTE — PROGRESS NOTES
PHYSICAL THERAPY - DAILY TREATMENT NOTE    Patient Name: Ang Walsh        Date: 10/31/2022  : 1975   yes Patient  Verified  Visit #:      16  Insurance: Payor: Enid Hogan / Plan: Shelley Mejia / Product Type: Managed Care Medicaid /      In time: 10:03 Out time: 10:55   Total Treatment Time: 52     Medicare/BCBS Time Tracking (below)   Total Timed Codes (min):  N/A 1:1 Treatment Time:  N/A     TREATMENT AREA =  Right ankle pain [M25.571]    SUBJECTIVE  Pain Level (on 0 to 10 scale):  5  / 10   Medication Changes/New allergies or changes in medical history, any new surgeries or procedures?    no  If yes, update Summary List   Subjective Functional Status/Changes:  []  No changes reported   Pt reports working long shifts, which cause her pain to get to a max of 10/10 (in [address] [place] [address]). Pt reports using heel lift in regular shoes (but came into PT without heel lift in shoe - pt had it in her purse). Pt reports swelling still occurs daily and she uses  compression stockings. Pt has been driving since MD discharged her boot and she feels comfortable doing that. Pt reports compliance with HEP. Pt reports she massages her ankle/foot/calf at home which improves pain/sxs. Pt reports using a type of Theragun a few times (PT advised pt NOT to use this on R LE). OBJECTIVE  Modalities Rationale:     decrease edema, decrease inflammation, and decrease pain to improve patient's ability to complete amb/standing activities.    min [] Estim, type/location:                                      []  att     []  unatt     []  w/US     []  w/ice    []  w/heat    min []  Mechanical Traction: type/lbs                   []  pro   []  sup   []  int   []  cont    []  before manual    []  after manual    min []  Ultrasound, settings/location:      min []  Iontophoresis w/ dexamethasone, location:                                               []  take home patch       []  in clinic   10 min [x]  Ice []  Heat    location/position: Supine to R foot/ankle/calf with LE elevated on wedge (2 pillow cases)    min []  Vasopneumatic Device, press/temp:    If using vaso (only need to measure limb vaso being performed on)      pre-treatment girth :       post-treatment girth :       measured at (landmark location) :      min []  Other:    [x] Skin assessment post-treatment (if applicable):    [x]  intact    [x]  no adverse reaction                  []redness - adverse reaction:      12 min Therapeutic Exercise:  [x]  See flow sheet   Rationale:      increase ROM and increase strength to improve the patients ability to complete amb/standing activities. 5 min Manual Therapy: Retrograde massage to R ankle/foot with LE elevated on wedge. Gentle scar massage. (Not billed)   Rationale:      decrease pain and decrease edema  to improve patient's ability to complete amb/standing activities. The manual therapy interventions were performed at a separate and distinct time from the therapeutic activities interventions. 10 min Neuromuscular Re-ed: [x]  See flow sheet   Rationale:    improve coordination, improve balance, and increase proprioception to improve the patients ability to complete amb/standing activities. 10 min Gait Training:  _250__ feet without device on level surfaces in regular shoe with heel lift with _SBA__ level of assistance (cues to improve heel to toe gait pattern). Pt negotiated 6 small hurdles 4 times forward and 4 times lateral with non reciprocal pattern, CGA. Rationale: To improve ambulation safety and efficiency in order to improve patient's ability to safely ambulate at home for self care. 5 min Self Care: Reviewed importance of wearing heel lift (level 2) in all regular shoes. Reviewed importance of elevation to decrease swelling. Pt instructed to use ice prn 10-15 minutes on /1 hour off. Pt reports understanding.  (Not billed)   Rationale:     improve gait pattern, decrease swelling, decrease pain  to improve the patients ability to complete amb/standing activities. Billed With/As:   [x] TE   [] TA   [] Neuro   [] Self Care Patient Education: [x] Review HEP    [] Progressed/Changed HEP based on:   [] positioning   [] body mechanics   [] transfers   [] heat/ice application    [x] other: Pt instructed on and given update HEP to be completed daily to improve R ankle strength. Pt instructed to stop if pain occurs. Pt reports understanding. Other Objective/Functional Measures:    *No signs/sxs of DVT or infection    *Increased resistance to OTB to improve ankle strength (without an increase in pain levels)     Post Treatment Pain Level (on 0 to 10) scale:   4  / 10     ASSESSMENT  Assessment/Changes in Function:     Pt denied sharp pains or red flags during PT session. Pt reported an improvement in pain/sxs at end of session. []  See Progress Note/Recertification   Patient will continue to benefit from skilled PT services to modify and progress therapeutic interventions, address functional mobility deficits, address ROM deficits, address strength deficits, analyze and address soft tissue restrictions, analyze and cue movement patterns, analyze and modify body mechanics/ergonomics, assess and modify postural abnormalities, address imbalance/dizziness, and instruct in home and community integration to attain remaining goals. Progress toward goals / Updated goals:    Pt able to demonstrate improvements in gait pattern with amb over level surface with cues from PT - progressing towards new goal #3. PLAN  [x]  Upgrade activities as tolerated yes Continue plan of care   []  Discharge due to :    [x]  Other: Plan to re-assess next session for update PN to be sent to MD (MD appt scheduled for 11/10/22).      Therapist: Xochitl Guardado PT    Date: 10/31/2022 Time: 10:55 AM     Future Appointments   Date Time Provider David Coronel   11/7/2022 12:00 PM Sharla Hernandez PT [facility]

## 2022-11-07 ENCOUNTER — TELEPHONE (OUTPATIENT)
Dept: PHYSICAL THERAPY | Age: 47
End: 2022-11-07

## 2022-12-08 NOTE — THERAPY DISCHARGE
[address], [address] [place], [place], 70 [facility] - Phone: [phone]  Fax: [phone]  DISCHARGE SUMMARY  Patient Name: Mery Mosley : 1975   Treatment/Medical Diagnosis: Right ankle pain [M25.571], s/p R Achilles Tendon Repair (DOS: 22)   Referral Source: Joe Carlton DPM     Date of Initial Visit: 22 Attended Visits: 11 Missed Visits: 3     SUMMARY OF TREATMENT  Physical therapy treatment consists of therapeutic exercises, neuromuscular re-ed and gait training to improve R ankle AROM, LE strength/stability, balance, gait pattern and functional mobility; manual therapy including retrograde massage, gentle scar massage, R ankle PROM prn; application of CP prn and instruction on HEP. CURRENT STATUS  Pt demonstrates fair progress with PT. Pt had returned to work and was working long hours which increase pain/swelling/sxs (max pain level at 10/10 while in [address] [place] [address] at work). Pt was reporting pain levels ranging from 4-7/10 over last 2 PT sessions. Pt came into PT without heel lift in regular shoe multiple visits, but reported wearing heel lift in shoes outside of therapy. Pt's R ankle AROM (as of visit on 10/24/22): 5 degrees DF with knee flex, 38 PF, 17 INV, 7 EV. Pt was scheduled to be re-assessed during next visit, but did not return to PT after visit on 10/31/22. Goal/Measure of Progress Goal Met? 1.  Pt to report 46/100 or > on FOTO scores to improve functional mobility for amb and stair negotiation. Status at last Eval: 18/100 at initial eval Current Status: Not re-assessed due to pt not returning to PT no   2. Pt to report 50% or > decrease in max pain levels to improve functional mobility for ADLs and amb. Status at last Eval: 10/10 Current Status: 10/10 no   3. Pt to demonstrate even stride length with amb over level surface of 250 ft in regular shoe without A device safely.    Status at last Eval: Amb in CAMboot without A device Current Status: Pt amb with antalgic gait in regular shoe with heel lift no     RECOMMENDATIONS  Patient discharged from PT due to not returning after visit on 10/31/22. Thank you for this referral.    If you have any questions/comments please contact us directly at [phone]. Thank you for allowing us to assist in the care of your patient. Therapist Signature: Walter Mcclellan DPT, ATC Date: 12/8/22     Time: 5:12 PM     NOTE TO PHYSICIAN:  Your patient's insurance requires this discharge note be signed and returned. PLEASE COMPLETE THE ORDERS BELOW AND RETURN TO:  [facility], [place] PHYSICAL THERAPY    ___ I have read the above report and request that my patient be discharged from therapy.      Physician Signature:        Date:       Time:                                        Nisreen Hyde DPM